# Patient Record
Sex: MALE | Race: BLACK OR AFRICAN AMERICAN | Employment: FULL TIME | URBAN - METROPOLITAN AREA
[De-identification: names, ages, dates, MRNs, and addresses within clinical notes are randomized per-mention and may not be internally consistent; named-entity substitution may affect disease eponyms.]

---

## 2023-10-04 PROBLEM — M54.2 CERVICALGIA: Status: ACTIVE | Noted: 2023-10-04

## 2023-10-04 PROBLEM — F90.9 ATTENTION-DEFICIT/HYPERACTIVITY DISORDER: Status: ACTIVE | Noted: 2023-10-04

## 2023-10-04 PROBLEM — M99.09 SEGMENTAL AND SOMATIC DYSFUNCTION: Status: ACTIVE | Noted: 2023-10-04

## 2023-10-04 PROBLEM — M99.04 SACROILIAC JOINT SOMATIC DYSFUNCTION: Status: ACTIVE | Noted: 2023-10-04

## 2023-10-05 ENCOUNTER — ALLIED HEALTH (OUTPATIENT)
Dept: INTEGRATIVE MEDICINE | Facility: CLINIC | Age: 23
End: 2023-10-05
Payer: COMMERCIAL

## 2023-10-05 DIAGNOSIS — M99.03 SOMATIC DYSFUNCTION OF LUMBAR REGION: Primary | ICD-10-CM

## 2023-10-05 DIAGNOSIS — M54.59 MECHANICAL LOW BACK PAIN: ICD-10-CM

## 2023-10-05 DIAGNOSIS — M54.2 CERVICALGIA: ICD-10-CM

## 2023-10-05 DIAGNOSIS — M99.01 SOMATIC DYSFUNCTION OF CERVICAL REGION: ICD-10-CM

## 2023-10-05 DIAGNOSIS — S29.012A STRAIN OF MID-BACK, INITIAL ENCOUNTER: ICD-10-CM

## 2023-10-05 DIAGNOSIS — M99.04 SACROILIAC JOINT SOMATIC DYSFUNCTION: ICD-10-CM

## 2023-10-05 DIAGNOSIS — M99.02 SOMATIC DYSFUNCTION OF THORACIC REGION: ICD-10-CM

## 2023-10-05 DIAGNOSIS — S16.1XXA STRAIN OF NECK MUSCLE, INITIAL ENCOUNTER: ICD-10-CM

## 2023-10-05 DIAGNOSIS — S39.012A STRAIN OF LUMBAR REGION, INITIAL ENCOUNTER: ICD-10-CM

## 2023-10-05 PROCEDURE — 97112 NEUROMUSCULAR REEDUCATION: CPT | Performed by: CHIROPRACTOR

## 2023-10-05 PROCEDURE — 98941 CHIROPRACT MANJ 3-4 REGIONS: CPT | Performed by: CHIROPRACTOR

## 2023-10-05 ASSESSMENT — ENCOUNTER SYMPTOMS
FATIGUE: 0
CONFUSION: 0
TROUBLE SWALLOWING: 0
FREQUENCY: 0
FEVER: 0
DIARRHEA: 0
ABDOMINAL PAIN: 0
CONSTIPATION: 0
CHEST TIGHTNESS: 0
JOINT SWELLING: 0
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
## 2023-10-12 ENCOUNTER — ALLIED HEALTH (OUTPATIENT)
Dept: INTEGRATIVE MEDICINE | Facility: CLINIC | Age: 23
End: 2023-10-12
Payer: COMMERCIAL

## 2023-10-12 DIAGNOSIS — M99.03 SOMATIC DYSFUNCTION OF LUMBAR REGION: Primary | ICD-10-CM

## 2023-10-12 DIAGNOSIS — M54.2 CERVICALGIA: ICD-10-CM

## 2023-10-12 DIAGNOSIS — S39.012A STRAIN OF LUMBAR REGION, INITIAL ENCOUNTER: ICD-10-CM

## 2023-10-12 DIAGNOSIS — S29.012A STRAIN OF MID-BACK, INITIAL ENCOUNTER: ICD-10-CM

## 2023-10-12 DIAGNOSIS — M54.59 MECHANICAL LOW BACK PAIN: ICD-10-CM

## 2023-10-12 DIAGNOSIS — M99.02 SOMATIC DYSFUNCTION OF THORACIC REGION: ICD-10-CM

## 2023-10-12 DIAGNOSIS — S16.1XXA STRAIN OF NECK MUSCLE, INITIAL ENCOUNTER: ICD-10-CM

## 2023-10-12 DIAGNOSIS — M99.04 SACROILIAC JOINT SOMATIC DYSFUNCTION: ICD-10-CM

## 2023-10-12 DIAGNOSIS — M99.01 SOMATIC DYSFUNCTION OF CERVICAL REGION: ICD-10-CM

## 2023-10-12 PROCEDURE — 98941 CHIROPRACT MANJ 3-4 REGIONS: CPT | Performed by: CHIROPRACTOR

## 2023-10-12 PROCEDURE — 97112 NEUROMUSCULAR REEDUCATION: CPT | Performed by: CHIROPRACTOR

## 2023-10-12 ASSESSMENT — ENCOUNTER SYMPTOMS
TROUBLE SWALLOWING: 0
CONFUSION: 0
JOINT SWELLING: 0
FATIGUE: 0
CONSTIPATION: 0
DIARRHEA: 0
FREQUENCY: 0
ABDOMINAL PAIN: 0
FEVER: 0
CHEST TIGHTNESS: 0
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
## 2023-10-19 ENCOUNTER — ALLIED HEALTH (OUTPATIENT)
Dept: INTEGRATIVE MEDICINE | Facility: CLINIC | Age: 23
End: 2023-10-19
Payer: COMMERCIAL

## 2023-10-19 DIAGNOSIS — M99.01 SOMATIC DYSFUNCTION OF CERVICAL REGION: ICD-10-CM

## 2023-10-19 DIAGNOSIS — M54.2 CERVICALGIA: ICD-10-CM

## 2023-10-19 DIAGNOSIS — M54.59 MECHANICAL LOW BACK PAIN: ICD-10-CM

## 2023-10-19 DIAGNOSIS — M99.04 SACROILIAC JOINT SOMATIC DYSFUNCTION: ICD-10-CM

## 2023-10-19 DIAGNOSIS — M99.03 SOMATIC DYSFUNCTION OF LUMBAR REGION: Primary | ICD-10-CM

## 2023-10-19 DIAGNOSIS — M99.02 SOMATIC DYSFUNCTION OF THORACIC REGION: ICD-10-CM

## 2023-10-19 PROCEDURE — 97112 NEUROMUSCULAR REEDUCATION: CPT | Performed by: CHIROPRACTOR

## 2023-10-19 PROCEDURE — 98941 CHIROPRACT MANJ 3-4 REGIONS: CPT | Performed by: CHIROPRACTOR

## 2023-10-19 ASSESSMENT — ENCOUNTER SYMPTOMS
TROUBLE SWALLOWING: 0
CONFUSION: 0
JOINT SWELLING: 0
FEVER: 0
ABDOMINAL PAIN: 0
FATIGUE: 0
CONSTIPATION: 0
DIARRHEA: 0
FREQUENCY: 0
CHEST TIGHTNESS: 0
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
## 2023-10-26 ENCOUNTER — APPOINTMENT (OUTPATIENT)
Dept: INTEGRATIVE MEDICINE | Facility: CLINIC | Age: 23
End: 2023-10-26
Payer: COMMERCIAL

## 2023-11-02 ENCOUNTER — ALLIED HEALTH (OUTPATIENT)
Dept: INTEGRATIVE MEDICINE | Facility: CLINIC | Age: 23
End: 2023-11-02
Payer: COMMERCIAL

## 2023-11-02 DIAGNOSIS — M99.03 SOMATIC DYSFUNCTION OF LUMBAR REGION: Primary | ICD-10-CM

## 2023-11-02 DIAGNOSIS — M54.59 MECHANICAL LOW BACK PAIN: ICD-10-CM

## 2023-11-02 DIAGNOSIS — M99.04 SACROILIAC JOINT SOMATIC DYSFUNCTION: ICD-10-CM

## 2023-11-02 DIAGNOSIS — M99.02 SOMATIC DYSFUNCTION OF THORACIC REGION: ICD-10-CM

## 2023-11-02 PROCEDURE — 97140 MANUAL THERAPY 1/> REGIONS: CPT | Performed by: CHIROPRACTOR

## 2023-11-02 PROCEDURE — 98940 CHIROPRACT MANJ 1-2 REGIONS: CPT | Performed by: CHIROPRACTOR

## 2023-11-02 ASSESSMENT — ENCOUNTER SYMPTOMS
CHEST TIGHTNESS: 0
DIARRHEA: 0
FREQUENCY: 0
FATIGUE: 0
ABDOMINAL PAIN: 0
CONSTIPATION: 0
FEVER: 0
TROUBLE SWALLOWING: 0
CONFUSION: 0
JOINT SWELLING: 0
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
## 2023-11-16 ENCOUNTER — EVALUATION (OUTPATIENT)
Dept: PHYSICAL THERAPY | Facility: CLINIC | Age: 23
End: 2023-11-16
Payer: COMMERCIAL

## 2023-11-16 DIAGNOSIS — S29.012A STRAIN OF MID-BACK, INITIAL ENCOUNTER: ICD-10-CM

## 2023-11-16 DIAGNOSIS — S16.1XXA STRAIN OF NECK MUSCLE, INITIAL ENCOUNTER: ICD-10-CM

## 2023-11-16 DIAGNOSIS — S39.012A STRAIN OF LUMBAR REGION, INITIAL ENCOUNTER: ICD-10-CM

## 2023-11-16 PROCEDURE — 97161 PT EVAL LOW COMPLEX 20 MIN: CPT | Mod: GP | Performed by: PHYSICAL THERAPIST

## 2023-11-16 PROCEDURE — 97110 THERAPEUTIC EXERCISES: CPT | Mod: GP | Performed by: PHYSICAL THERAPIST

## 2023-12-08 ENCOUNTER — APPOINTMENT (OUTPATIENT)
Dept: PHYSICAL THERAPY | Facility: CLINIC | Age: 23
End: 2023-12-08
Payer: COMMERCIAL

## 2023-12-15 ENCOUNTER — APPOINTMENT (OUTPATIENT)
Dept: PHYSICAL THERAPY | Facility: CLINIC | Age: 23
End: 2023-12-15
Payer: COMMERCIAL

## 2023-12-21 ENCOUNTER — HOSPITAL ENCOUNTER (EMERGENCY)
Facility: HOSPITAL | Age: 23
Discharge: HOME | End: 2023-12-21
Payer: COMMERCIAL

## 2023-12-21 VITALS
DIASTOLIC BLOOD PRESSURE: 74 MMHG | TEMPERATURE: 98.1 F | HEART RATE: 71 BPM | RESPIRATION RATE: 20 BRPM | BODY MASS INDEX: 27.72 KG/M2 | OXYGEN SATURATION: 99 % | WEIGHT: 170 LBS | SYSTOLIC BLOOD PRESSURE: 134 MMHG

## 2023-12-21 PROCEDURE — 99281 EMR DPT VST MAYX REQ PHY/QHP: CPT

## 2023-12-21 PROCEDURE — 4500999001 HC ED NO CHARGE

## 2023-12-21 ASSESSMENT — COLUMBIA-SUICIDE SEVERITY RATING SCALE - C-SSRS
6. HAVE YOU EVER DONE ANYTHING, STARTED TO DO ANYTHING, OR PREPARED TO DO ANYTHING TO END YOUR LIFE?: NO
2. HAVE YOU ACTUALLY HAD ANY THOUGHTS OF KILLING YOURSELF?: NO
1. IN THE PAST MONTH, HAVE YOU WISHED YOU WERE DEAD OR WISHED YOU COULD GO TO SLEEP AND NOT WAKE UP?: NO

## 2023-12-22 ENCOUNTER — TREATMENT (OUTPATIENT)
Dept: PHYSICAL THERAPY | Facility: CLINIC | Age: 23
End: 2023-12-22
Payer: COMMERCIAL

## 2023-12-22 DIAGNOSIS — S39.012A STRAIN OF LUMBAR REGION, INITIAL ENCOUNTER: ICD-10-CM

## 2023-12-22 DIAGNOSIS — S16.1XXA STRAIN OF NECK MUSCLE, INITIAL ENCOUNTER: Primary | ICD-10-CM

## 2023-12-22 DIAGNOSIS — S29.012A STRAIN OF MID-BACK, INITIAL ENCOUNTER: ICD-10-CM

## 2023-12-22 PROCEDURE — 97110 THERAPEUTIC EXERCISES: CPT | Mod: GP | Performed by: PHYSICAL THERAPIST
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
## 2023-12-29 ENCOUNTER — APPOINTMENT (OUTPATIENT)
Dept: PHYSICAL THERAPY | Facility: CLINIC | Age: 23
End: 2023-12-29
Payer: COMMERCIAL

## 2024-01-05 ENCOUNTER — APPOINTMENT (OUTPATIENT)
Dept: PHYSICAL THERAPY | Facility: CLINIC | Age: 24
End: 2024-01-05

## 2024-01-12 ENCOUNTER — APPOINTMENT (OUTPATIENT)
Dept: PHYSICAL THERAPY | Facility: CLINIC | Age: 24
End: 2024-01-12

## 2024-02-08 ENCOUNTER — HOSPITAL ENCOUNTER (EMERGENCY)
Facility: HOSPITAL | Age: 24
Discharge: HOME | End: 2024-02-08
Payer: COMMERCIAL

## 2024-02-08 ENCOUNTER — APPOINTMENT (OUTPATIENT)
Dept: RADIOLOGY | Facility: HOSPITAL | Age: 24
End: 2024-02-08
Payer: COMMERCIAL

## 2024-02-08 VITALS
HEART RATE: 72 BPM | OXYGEN SATURATION: 97 % | RESPIRATION RATE: 20 BRPM | BODY MASS INDEX: 28.53 KG/M2 | WEIGHT: 175 LBS | TEMPERATURE: 98.2 F | DIASTOLIC BLOOD PRESSURE: 67 MMHG | SYSTOLIC BLOOD PRESSURE: 115 MMHG

## 2024-02-08 DIAGNOSIS — R07.81 RIB PAIN: Primary | ICD-10-CM

## 2024-02-08 PROCEDURE — 2500000001 HC RX 250 WO HCPCS SELF ADMINISTERED DRUGS (ALT 637 FOR MEDICARE OP): Performed by: NURSE PRACTITIONER

## 2024-02-08 PROCEDURE — 71101 X-RAY EXAM UNILAT RIBS/CHEST: CPT | Mod: RIGHT SIDE | Performed by: RADIOLOGY

## 2024-02-08 PROCEDURE — 71101 X-RAY EXAM UNILAT RIBS/CHEST: CPT | Mod: RT

## 2024-02-08 PROCEDURE — 99283 EMERGENCY DEPT VISIT LOW MDM: CPT | Mod: 25,27

## 2024-02-08 RX ORDER — IBUPROFEN 600 MG/1
600 TABLET ORAL EVERY 6 HOURS PRN
Qty: 20 TABLET | Refills: 0 | Status: SHIPPED | OUTPATIENT
Start: 2024-02-08 | End: 2024-02-13

## 2024-02-08 RX ORDER — IBUPROFEN 600 MG/1
600 TABLET ORAL ONCE
Status: COMPLETED | OUTPATIENT
Start: 2024-02-08 | End: 2024-02-08

## 2024-02-08 RX ORDER — ACETAMINOPHEN 325 MG/1
650 TABLET ORAL ONCE
Status: COMPLETED | OUTPATIENT
Start: 2024-02-08 | End: 2024-02-08

## 2024-02-08 RX ADMIN — ACETAMINOPHEN 650 MG: 325 TABLET ORAL at 16:24

## 2024-02-08 RX ADMIN — IBUPROFEN 600 MG: 600 TABLET, FILM COATED ORAL at 16:24

## 2024-02-08 ASSESSMENT — PAIN - FUNCTIONAL ASSESSMENT: PAIN_FUNCTIONAL_ASSESSMENT: 0-10

## 2024-02-08 ASSESSMENT — COLUMBIA-SUICIDE SEVERITY RATING SCALE - C-SSRS
1. IN THE PAST MONTH, HAVE YOU WISHED YOU WERE DEAD OR WISHED YOU COULD GO TO SLEEP AND NOT WAKE UP?: NO
2. HAVE YOU ACTUALLY HAD ANY THOUGHTS OF KILLING YOURSELF?: NO
6. HAVE YOU EVER DONE ANYTHING, STARTED TO DO ANYTHING, OR PREPARED TO DO ANYTHING TO END YOUR LIFE?: NO

## 2024-02-08 ASSESSMENT — PAIN SCALES - GENERAL: PAINLEVEL_OUTOF10: 8

## 2024-02-08 NOTE — ED TRIAGE NOTES
Secondary to patient volumes and overcrowding, I performed a brief medical screening exam of the patient in triage, as the patient awaits space in the main ED.    History of Present Illness:  Romi Lake presents with   Chief Complaint   Patient presents with    Rib Injury     Pt arrives private auto from home. States he was landed on while playing basketball yesterday and injured right side/ribs. States it hurts to tale a deep breath.        Physical Exam:  General - In no acute distress  Respiratory - Breathing comfortably  Neurologic - Moving all extremities    Medical Decision Making:  Patient will require further evaluation in the main ED.    Initial diagnostic tests were ordered from triage.      The patient demonstrates understanding that this initial evaluation is a brief medical screening exam and the expectation is that they await for space in the main ED to be further evaluated.  The patient understands that, if they leave prior to further evaluation in the main ED after this initial evaluation in triage, they are doing so under their own accord knowing that their evaluation/work-up is not yet complete. The patient also understands that any preliminary diagnostic results, including abnormalities, may not be shared with them, if they choose to leave prior to further evaluation in the main ED.

## 2024-02-08 NOTE — ED PROVIDER NOTES
HPI   Chief Complaint   Patient presents with    Rib Injury     Pt arrives private auto from home. States he was landed on while playing basketball yesterday and injured right side/ribs. States it hurts to tale a deep breath.        24-year-old male with no significant past medical's reported who presents to the ED today after mechanical fall.  Patient states he was playing basketball and fell onto his side and then someone fell on top of him and he has pain in his right ribs.  Patient has any shortness of breath or any chest pains.  Patient is taken nothing for the pain but wanted to get checked out to make sure he did not break anything.      History provided by:  Patient   used: No                        No data recorded                   Patient History   Past Medical History:   Diagnosis Date    Concussion without loss of consciousness, initial encounter 02/27/2017    Concussion without loss of consciousness, initial encounter    Muscle spasm of back 01/25/2018    Paraspinal muscle spasm    Pain in left knee 02/06/2018    Left knee pain    Patellar tendinitis, left knee 04/25/2018    Patellar tendinitis of left knee    Patellar tendinitis, right knee 04/25/2018    Patellar tendinitis of right knee    Personal history of other diseases of the musculoskeletal system and connective tissue 01/25/2018    History of back pain    Sprain of metacarpophalangeal joint of left thumb, initial encounter 11/13/2019    Sprain of metacarpophalangeal (MCP) joint of left thumb, initial encounter     History reviewed. No pertinent surgical history.  No family history on file.  Social History     Tobacco Use    Smoking status: Not on file    Smokeless tobacco: Not on file   Substance Use Topics    Alcohol use: Not on file    Drug use: Not on file       Physical Exam   ED Triage Vitals [02/08/24 1524]   Temperature Heart Rate Respirations BP   36.8 °C (98.2 °F) 76 16 117/68      Pulse Ox Temp src Heart Rate  Source Patient Position   97 % -- -- --      BP Location FiO2 (%)     -- --       Physical Exam  Vitals and nursing note reviewed.   Constitutional:       General: He is not in acute distress.     Appearance: He is well-developed.   HENT:      Head: Normocephalic and atraumatic.   Eyes:      Conjunctiva/sclera: Conjunctivae normal.   Cardiovascular:      Rate and Rhythm: Normal rate and regular rhythm.      Heart sounds: No murmur heard.  Pulmonary:      Effort: Pulmonary effort is normal. No respiratory distress.      Breath sounds: Normal breath sounds.   Chest:      Chest wall: Tenderness present. No swelling, crepitus or edema.          Comments: Tenderness location noted on Avatar  Abdominal:      Palpations: Abdomen is soft.      Tenderness: There is no abdominal tenderness.   Musculoskeletal:         General: No swelling.      Cervical back: Neck supple.   Skin:     General: Skin is warm and dry.      Capillary Refill: Capillary refill takes less than 2 seconds.   Neurological:      Mental Status: He is alert.   Psychiatric:         Mood and Affect: Mood normal.         ED Course & MDM   ED Course as of 02/08/24 1653   Thu Feb 08, 2024   1648 XR ribs right 2 views w chest pa or ap  1. No evidence of acute cardiopulmonary process.  2. No obvious displaced rib fracture.   [WS]      ED Course User Index  [WS] LEATHA Cary-CNP         Diagnoses as of 02/08/24 1653   Rib pain       Medical Decision Making  Differential diagnosis: Bruised ribs, rib fracture, pneumothorax, pneumonia.  Patient's vital signs are stable.  Patient was given ibuprofen and Tylenol with significant relief of pain in the emergency department.  Patient's x-ray imaging reveals no obvious displaced rib fracture.  There is no acute cardiopulmonary process including pneumothorax or pneumonia.  Given these findings patient be given incentive spirometry and he was advised that sometimes the x-rays are negative but he does not fact have a  broken rib.  I did advise him to return to ED if shortness of breath, chest pain, or fevers occur.    I discussed the differential, results and discharge plan with the patient.  I emphasized the importance of follow-up with the physician I referred them to in the timeframe recommended.  I explained reasons for the them to return to the Emergency Department. Additional verbal discharge instructions were also given and discussed with them to supplement those generated by the EMR. We also discussed medications that were prescribed (if any) including common side effects and interactions. All questions were addressed.  They understand return precautions and discharge instructions. They expressed understanding.        Amount and/or Complexity of Data Reviewed  Radiology: ordered and independent interpretation performed. Decision-making details documented in ED Course.    Risk  OTC drugs.  Prescription drug management.        Procedure  Procedures     LEATHA Cary-MANDI  02/08/24 4202

## 2024-03-20 ENCOUNTER — DOCUMENTATION (OUTPATIENT)
Dept: PHYSICAL THERAPY | Facility: CLINIC | Age: 24
End: 2024-03-20
Payer: COMMERCIAL

## 2025-03-06 NOTE — PROGRESS NOTES
"Chief Complaint   Patient presents with    Right Shoulder - Pain     5       Consulting physician: Puneet Shaffer MD    A report with my findings and recommendations will be sent to the primary and referring physician via written or electronic means when information is available    History of Present Illness:  Romi Lake is a [facility] 25 y.o. male who is active in basketball who presented on 03/07/2025 with pain in right shoulder.  2/19/25 He was playing basketball and ran into the padded wall. Hit Anterior lateral R shoulder against padded wall. He was unable to move the shoulder afterward. Pt states he felt like his R should was \"out of place\" and he could see a \"drop off\".  While being examined in the ED he felt like his shoulder \"went back in\".  Presented to [facility] ED and diagnosed with grade 1 AC separation.  Reported pain over the AC joint. X-ray of shoulder not remarkable. He was provided with a sling and recommended to take ibuprofen.  Referred for further management.  He currently has 5/10 pain at rest with decreased ROM.  Ibuprofen helps pain but he continues to have difficulty sleeping.  He has been unable to return to work as he cannot raise R shoulder fully.  Pain with ADLs, getting dressed. Able to drive but cannot lift heavy items.     Past MSK HX:  Specialty Problems          Orthopaedic Problems    Cervicalgia        Sacroiliac joint somatic dysfunction        Segmental and somatic dysfunction        Cervical strain        Mechanical low back pain        Somatic dysfunction of cervical region        Somatic dysfunction of lumbar region        Somatic dysfunction of thoracic region        Strain of lumbar region        Strain of mid-back            ROS  12 point ROS reviewed and is negative except for items listed     Social Hx:  Home:  Lives with daughter  Sports: basketball  School:  [employer], graduated  Works driving a delivery truck      Medications:   No current outpatient medications on " file prior to visit.     No current facility-administered medications on file prior to visit.         Allergies:  No Known Allergies     Physical Exam:    There were no vitals taken for this visit.     Vitals reviewed    General appearance: Well-appearing well-nourished  Psych: Normal mood and affect    Neuro: Normal sensation to light touch throughout the involved extremities  Vascular: No extremity edema or discoloration.  Skin: negative.  Lymphatic: no regional lymphadenopathy present.  Eyes: no conjunctival injection.    BILATERAL  SHOULDER EXAM    Inspection:  Posture: guarded R shoulder  Erythema: No   Swelling/bruising: R shoulder anteriorly   Muscle atrophy No     Range of motion:   Abduction 90 pain R  Extension (40) full, pain free   Adduction (20-40) full, pain R  Forward flexion 90 pain R  Internal rotation in adduction (80-90) full, pain R  Internal rotation in abduction (40) full, pain R  External rotation in adduction (90) full, pain R  External rotation in abduction () full, pain R    Cervical spine   Flexion (50-70) full, no pain   Extension (60-85)) full, no pain  Lateral bend R (40-50) full, no pain   Lateral bend L (40-50) full, no pain   Lateral rotation R (60-75) full, no pain   Lateral rotation L (60-75) full, no pain       Shoulder Palpation:   TTP SC joint no   TTP clavicle  no   TTP Bicipital groove R   TTP AC joint R  TTP humeral head R  TTP anterior joint line  R  TTP posterior joint line  no   TTP scapula no     TTP deltoids no   TTP trapezius no   TTP rhomboids no   TTP teres minor or infraspinatus no   TTP supraspinatus no   TTP pectoralis R   TTP biceps  no   TTP triceps  no     TTP midline cervical spine no   TTP paraspinous muscles no    Strength:   Supraspinatus pain R 4/5  Infraspinatus and teres minor pain R 5/5  Subscapularis  pain R 4-/5  Deltoid pain R 5/5  Latissimus pain free, 5/5    Normal sensation:  C8 dermatome/ulnar nerve: small finger   C7 dermatome/meidan nerve:  middle finger   C6 dermatome/radial nerve: thumb   C5 dermatome/axillary nerve: deltoid patch     Impingement tests:   Hawkin's: R pain  Neer's: R pain    AC joint: R pain    Biceps tendon tests:   Speeds: Negative   Yergason's: Negative    Stability testing:   Apprehension: R pain  Relocation: Negative   Anterior glide: R pain   Posterior glide: R pain  Sulcus:  1 cm     Labral tests:  Obrein's: R pain  Clunk: negative   Shift: negative         Imaging:  R shoulder xrays reviewed. No fracture noted  Imaging was personally interpreted and reviewed with the patient and/or family    Impression and Plan:  Romi Lake is a [facility] 25 y.o. male who is active in basketball who presented on 03/07/2025 with pain in right shoulder consistent with R shoulder dislocation, possible labral pathology.  2/19/25 He was playing basketball and ran into the padded wall. Hit Anterior lateral R shoulder against padded wall. He was unable to move the shoulder afterward. Shoulder self-reduced when xrays obtained.  Exam notable for guarded position, pain with abduction 90, flexion 90, internal rotation, TTP anterior joint line, bicep tendon proximally, provocative maneuvers concerning for labral pathology and dislocation.  MRI labral protocol ordered as Romi continues to have limited ROM despite use of sling, NSAIDs, PT exercises.  Will meet via telehealth to discuss MRI results.     Standard mandates to have MRI performed include no improvement with rest, physical therapy exercises, NSAIDs and no diagnosis revealed on Xray/concern for occult fracture/need for surgery. This patient has had specific joint pain for weeks, has been seen by multiple providers, rested and attended physical therapy for weeks. Exam findings include effusion, TTP, pain, limited ROM, + provocative maneuvers which support meeting criteria to approve MRI.         ** Please excuse any errors in grammar or translation related to this dictation. Voice recognition  software was utilized to prepare this document. **

## 2025-03-07 ENCOUNTER — OFFICE VISIT (OUTPATIENT)
Dept: ORTHOPEDIC SURGERY | Facility: CLINIC | Age: 25
End: 2025-03-07
Payer: COMMERCIAL

## 2025-03-07 VITALS
WEIGHT: 170.53 LBS | HEIGHT: 66 IN | SYSTOLIC BLOOD PRESSURE: 119 MMHG | HEART RATE: 77 BPM | DIASTOLIC BLOOD PRESSURE: 59 MMHG | BODY MASS INDEX: 27.41 KG/M2

## 2025-03-07 DIAGNOSIS — S43.014A ANTERIOR DISLOCATION OF RIGHT SHOULDER, INITIAL ENCOUNTER: Primary | ICD-10-CM

## 2025-03-07 PROCEDURE — 1036F TOBACCO NON-USER: CPT | Performed by: PEDIATRICS

## 2025-03-07 PROCEDURE — 99204 OFFICE O/P NEW MOD 45 MIN: CPT | Performed by: PEDIATRICS

## 2025-03-07 PROCEDURE — 99214 OFFICE O/P EST MOD 30 MIN: CPT | Performed by: PEDIATRICS

## 2025-03-07 RX ORDER — NAPROXEN 500 MG/1
500 TABLET ORAL 2 TIMES DAILY
Qty: 60 TABLET | Refills: 0 | Status: SHIPPED | OUTPATIENT
Start: 2025-03-07 | End: 2025-04-06

## 2025-03-07 RX ORDER — IBUPROFEN 600 MG/1
600 TABLET ORAL EVERY 6 HOURS PRN
COMMUNITY
Start: 2025-02-19

## 2025-03-07 ASSESSMENT — PAIN SCALES - GENERAL: PAINLEVEL_OUTOF10: 5

## 2025-03-07 NOTE — LETTER
"March 7, 2025     Puneet Shaffer MD  [id number] [address]  [place] [id number]    Patient: Romi Lake   YOB: 2000   Date of Visit: 3/7/2025       Dear Dr. Puneet Shaffer MD:    Thank you for referring Romi Lake to me for evaluation. Below are my notes for this consultation.  If you have questions, please do not hesitate to call me. I look forward to following your patient along with you.       Sincerely,     Kimi Paul, DO      CC: No Recipients  ______________________________________________________________________________________    Chief Complaint   Patient presents with   • Right Shoulder - Pain     5       Consulting physician: Puneet Shaffer MD    A report with my findings and recommendations will be sent to the primary and referring physician via written or electronic means when information is available    History of Present Illness:  Romi Lake is a [facility] 25 y.o. male who is active in basketball who presented on 03/07/2025 with pain in right shoulder.  2/19/25 He was playing basketball and ran into the padded wall. Hit Anterior lateral R shoulder against padded wall. He was unable to move the shoulder afterward. Pt states he felt like his R should was \"out of place\" and he could see a \"drop off\".  While being examined in the ED he felt like his shoulder \"went back in\".  Presented to [facility] ED and diagnosed with grade 1 AC separation.  Reported pain over the AC joint. X-ray of shoulder not remarkable. He was provided with a sling and recommended to take ibuprofen.  Referred for further management.  He currently has 5/10 pain at rest with decreased ROM.  Ibuprofen helps pain but he continues to have difficulty sleeping.  He has been unable to return to work as he cannot raise R shoulder fully.  Pain with ADLs, getting dressed. Able to drive but cannot lift heavy items.     Past MSK HX:  Specialty Problems          Orthopaedic Problems    Cervicalgia        " Sacroiliac joint somatic dysfunction        Segmental and somatic dysfunction        Cervical strain        Mechanical low back pain        Somatic dysfunction of cervical region        Somatic dysfunction of lumbar region        Somatic dysfunction of thoracic region        Strain of lumbar region        Strain of mid-back            ROS  12 point ROS reviewed and is negative except for items listed     Social Hx:  Home:  Lives with daughter  Sports: basketball  School:  [employer], graduated  Works driving a delivery truck      Medications:   No current outpatient medications on file prior to visit.     No current facility-administered medications on file prior to visit.         Allergies:  No Known Allergies     Physical Exam:    There were no vitals taken for this visit.     Vitals reviewed    General appearance: Well-appearing well-nourished  Psych: Normal mood and affect    Neuro: Normal sensation to light touch throughout the involved extremities  Vascular: No extremity edema or discoloration.  Skin: negative.  Lymphatic: no regional lymphadenopathy present.  Eyes: no conjunctival injection.    BILATERAL  SHOULDER EXAM    Inspection:  Posture: guarded R shoulder  Erythema: No   Swelling/bruising: R shoulder anteriorly   Muscle atrophy No     Range of motion:   Abduction 90 pain R  Extension (40) full, pain free   Adduction (20-40) full, pain R  Forward flexion 90 pain R  Internal rotation in adduction (80-90) full, pain R  Internal rotation in abduction (40) full, pain R  External rotation in adduction (90) full, pain R  External rotation in abduction () full, pain R    Cervical spine   Flexion (50-70) full, no pain   Extension (60-85)) full, no pain  Lateral bend R (40-50) full, no pain   Lateral bend L (40-50) full, no pain   Lateral rotation R (60-75) full, no pain   Lateral rotation L (60-75) full, no pain       Shoulder Palpation:   TTP SC joint no   TTP clavicle  no   TTP Bicipital groove R    TTP AC joint R  TTP humeral head R  TTP anterior joint line  R  TTP posterior joint line  no   TTP scapula no     TTP deltoids no   TTP trapezius no   TTP rhomboids no   TTP teres minor or infraspinatus no   TTP supraspinatus no   TTP pectoralis R   TTP biceps  no   TTP triceps  no     TTP midline cervical spine no   TTP paraspinous muscles no    Strength:   Supraspinatus pain R 4/5  Infraspinatus and teres minor pain R 5/5  Subscapularis  pain R 4-/5  Deltoid pain R 5/5  Latissimus pain free, 5/5    Normal sensation:  C8 dermatome/ulnar nerve: small finger   C7 dermatome/meidan nerve: middle finger   C6 dermatome/radial nerve: thumb   C5 dermatome/axillary nerve: deltoid patch     Impingement tests:   Hawkin's: R pain  Neer's: R pain    AC joint: R pain    Biceps tendon tests:   Speeds: Negative   Yergason's: Negative    Stability testing:   Apprehension: R pain  Relocation: Negative   Anterior glide: R pain   Posterior glide: R pain  Sulcus:  1 cm     Labral tests:  Obrein's: R pain  Clunk: negative   Shift: negative         Imaging:  R shoulder xrays reviewed. No fracture noted  Imaging was personally interpreted and reviewed with the patient and/or family    Impression and Plan:  Romi Lake is a [facility] 25 y.o. male who is active in basketball who presented on 03/07/2025 with pain in right shoulder consistent with R shoulder dislocation, possible labral pathology.  2/19/25 He was playing basketball and ran into the padded wall. Hit Anterior lateral R shoulder against padded wall. He was unable to move the shoulder afterward. Shoulder self-reduced when xrays obtained.  Exam notable for guarded position, pain with abduction 90, flexion 90, internal rotation, TTP anterior joint line, bicep tendon proximally, provocative maneuvers concerning for labral pathology and dislocation.  MRI labral protocol ordered as Romi continues to have limited ROM despite use of sling, NSAIDs, PT exercises.  Will meet via  telehealth to discuss MRI results.     Standard mandates to have MRI performed include no improvement with rest, physical therapy exercises, NSAIDs and no diagnosis revealed on Xray/concern for occult fracture/need for surgery. This patient has had specific joint pain for weeks, has been seen by multiple providers, rested and attended physical therapy for weeks. Exam findings include effusion, TTP, pain, limited ROM, + provocative maneuvers which support meeting criteria to approve MRI.         ** Please excuse any errors in grammar or translation related to this dictation. Voice recognition software was utilized to prepare this document. **

## 2025-03-07 NOTE — PATIENT INSTRUCTIONS
The patient has been referred for advanced imaging through [facility]. Please call [phone] and set up an appointment to have this study completed. We recommend booking at a NON-HOSPITAL location. You will need to allow time for the pre-authorization process. We recommend you call back 1 day prior to your appointment to confirm that your insurance company approved the study. Do not having imaging completed until it is approved.     We request that you call our main office at [phone] once your imaging study has been completed. HOLD ON THE LINE TO TALK DIRECTLY TO OUR OFFICE STAFF. DO NOT PRESS 1 TO SCHEDULE. You may set up an in person appointment or a telehealth appointment to review the imaging results. Please leave us a good call back number. Make sure your voicemail box is accepting messages and be aware we often make calls from private numbers. If you do not receive a call back within 48 business hours, please feel free to call our office again.

## 2025-03-07 NOTE — LETTER
March 7, 2025     Patient: Romi Lake   YOB: 2000   Date of Visit: 3/7/2025       To Whom It May Concern:    It is my medical opinion that Romi Lake may return to light duty immediately with the following restrictions: Limit lifting to 15 pounds . No over head lifting.  Further studies are pending.    If you have any questions or concerns, please don't hesitate to call.         Sincerely,        Kimi Paul DO    CC: No Recipients